# Patient Record
Sex: MALE | Race: WHITE | URBAN - METROPOLITAN AREA
[De-identification: names, ages, dates, MRNs, and addresses within clinical notes are randomized per-mention and may not be internally consistent; named-entity substitution may affect disease eponyms.]

---

## 2023-03-06 ENCOUNTER — HOME HEALTH ADMISSION (OUTPATIENT)
Dept: HOME HEALTH SERVICES | Facility: HOME HEALTH | Age: 88
End: 2023-03-06
Payer: MEDICARE

## 2023-03-13 ENCOUNTER — HOME CARE VISIT (OUTPATIENT)
Dept: SCHEDULING | Facility: HOME HEALTH | Age: 88
End: 2023-03-13

## 2023-03-13 VITALS
OXYGEN SATURATION: 97 % | HEART RATE: 79 BPM | DIASTOLIC BLOOD PRESSURE: 62 MMHG | SYSTOLIC BLOOD PRESSURE: 116 MMHG | TEMPERATURE: 96.9 F | RESPIRATION RATE: 18 BRPM

## 2023-03-13 PROCEDURE — G0299 HHS/HOSPICE OF RN EA 15 MIN: HCPCS

## 2023-03-13 PROCEDURE — 0221000100 HH NO PAY CLAIM PROCEDURE

## 2023-03-13 ASSESSMENT — ENCOUNTER SYMPTOMS
DYSPNEA ACTIVITY LEVEL: AFTER AMBULATING 10 - 20 FT
PAIN LOCATION - PAIN QUALITY: ACHY

## 2023-03-13 NOTE — HOME HEALTH
Patient is a 79 y/o/m with past medical hx of htn, afib, copd seen today for soc after recent dc from [facility] that resulted from uti, pt found at [facility] at time of arrival but later accompanied by declan, met writer at front door with use of walker, pt a+o x3, vs wnl, denies any current pain but reports occasional pain to right hip that is an old fx with hardware, and right knee pain. lung sounds clear, denies any sob or angina, bowel sounds present, reports adequate food/fluid intake, denies any issues with elimination or constipation, no s/s of uti, pt denies any recent falls, skin intact, thought process mostly organized, noted to be somewhat tangential, insight/judgement fair,  pt noted with f/u appt today, pt agreeable to services and consented to tx, PT added due to decreased mobility and endurance, all questions and concerns addressed, will continue to monitor,     POC approval received from PCP. Caregiver involvement: via Sera Siegel    Medications reconciled and all medications are available. MD notified of Severe medication interaction. No new orders at this time. Home health supplies by type and quantity ordered/delivered this visit include: na    Patient education provided this visit: SN educated patient and patient's caregiver on admission process, call us first procedure, s/s of infection.  Patient and patient caregiver verbalizes understanding via the teach back method. Progress toward goals: progressing well    Home exercise program: continue as ordered     Plan for next visit: disease/medication management     The following discharge planning was discussed with the patient/ patient's caregiver: DC when goals met.

## 2023-03-14 ENCOUNTER — HOME CARE VISIT (OUTPATIENT)
Dept: SCHEDULING | Facility: HOME HEALTH | Age: 88
End: 2023-03-14

## 2023-03-14 VITALS
DIASTOLIC BLOOD PRESSURE: 70 MMHG | SYSTOLIC BLOOD PRESSURE: 140 MMHG | RESPIRATION RATE: 18 BRPM | OXYGEN SATURATION: 96 % | TEMPERATURE: 97.1 F

## 2023-03-14 PROCEDURE — G0151 HHCP-SERV OF PT,EA 15 MIN: HCPCS

## 2023-03-14 ASSESSMENT — ENCOUNTER SYMPTOMS
STOOL DESCRIPTION: FORMED
PAIN LOCATION - PAIN QUALITY: ACHY

## 2023-03-14 NOTE — HOME HEALTH
Patient is a 80year old male with a dx of COPD who has a hx of falls, prostatic cancer and has had essential tremor since he was in his teens. He had recent hospitalization secondary to a fall and subsequently went to rehab at [facility]. He has been home now for about 5 days and was seen today by skilled physical therapy. He lives in a single family home with his cat and has a caregiver who comes in to check on him daily and assist him as needed. He was found today to have deficits in the following areas:   Posture: He presents with head forward posture, increased thoracic kyphosis, decreased lumbar lordosis and  mild flexion of hips in stance and throughout the gait cycle. Pain: Patient reports that he has intermittent pain in right hip during ambulation of 4/10 which is increased by ambulation. Strength: Grossly 3/5 for major muscle groups of bilateral hips, knees and ankles  Transfers: Mod assist for safety during transfers sit<>Stand, toilet<>Stand, bed<>stand and in and out of shower. Balance: Good in sitting. Fair- in standing. Patient scores 12/28 on [employer] WILLIAM indicating that she is at high risk of falls. Gait: Patient is currently ambulating with 4 wheeled walker with antalgic gait pattern, decreased bilateral foot clearance, heel toe gait, short step length. He ambulated 50 feet with 4ww today with verbal cues. Functionally, this patient is at high risk of falls secondary to weakness in B LEs and deficits in balance which make it difficult for him to transfer and walk safely. He would benefit from skilled physical therapy 2w3, 1w3 from 3/14/2023 to include ther exs, therapeutic activities, neuromuscular reeducation and gait training to decrease risk of falls and restore to prior level of function. Instructed patient today in safety during transfers including use of brakes on four wheeled walker.  Worked on standing balance with and without walker, immediate standing balance when he first stands from sitting. Also, instructed him in correct use of walker during ambulation, safety during turns, good bilateral foot clearance. in standing exercises holding on to kitchen sink: heel raises, hip extenxion, hip abduction, hams curls, marching, x 10 each exercise, each leg. Good performance of same using teach-back method.

## 2023-03-17 ENCOUNTER — HOME CARE VISIT (OUTPATIENT)
Dept: HOME HEALTH SERVICES | Facility: HOME HEALTH | Age: 88
End: 2023-03-17
Payer: MEDICARE

## 2023-03-22 ENCOUNTER — HOME HEALTH ADMISSION (OUTPATIENT)
Dept: HOME HEALTH SERVICES | Facility: HOME HEALTH | Age: 88
End: 2023-03-22

## 2023-04-02 ASSESSMENT — ENCOUNTER SYMPTOMS
DYSPNEA ACTIVITY LEVEL: AFTER AMBULATING 10 - 20 FT
STOOL DESCRIPTION: FORMED
PAIN LOCATION - PAIN QUALITY: ACHY

## 2023-04-02 NOTE — HOME HEALTH
Patient is being discharged from home health services as his caregiver reports that he is being seen by another agency. Findings in this report are base on PT intial evaluation o 3/14/2023. is a 80year old male with a dx of COPD who has a hx of falls, prostatic cancer and has had essential tremor since he was in his teens. He had recent hospitalization secondary to a fall and subsequently went to rehab at [facility]. He ws assessed by skilled physical therapy on 3/14/2023. He lives in a single family home with his cat and has a caregiver who comes in to check on him daily and assist him as needed. On that date he was found today to have deficits in the following areas: Posture: He presents with head forward posture, increased thoracic kyphosis, decreased lumbar lordosis and mild flexion of hips in stance and throughout the gait cycle. Pain: Patient reports that he has intermittent pain in right hip during ambulation of 4/10 which is increased by ambulation. Strength: Grossly 3/5 for major muscle groups of bilateral hips, knees and ankles Transfers: Mod assist for safety during transfers sit<>Stand, toilet<>Stand, bed<>stand and in and out of shower. Balance: Good in sitting. Fair- in standing. Patient scores 12/28 on Tinetti WILLIAM indicating that she is at high risk of falls. Gait: Patient is currently ambulating with 4 wheeled walker with antalgic gait pattern, decreased bilateral foot clearance, heel toe gait, short step length. He ambulated 50 feet with 4ww today with verbal cues. Functionally, this patient is at high risk of falls secondary to weakness in B LEs and deficits in balance which make it difficult for him to transfer and walk safely. He was instructed patient in safety during transfers including use of brakes on four wheeled walker. Worked on standing balance with and without walker, immediate standing balance when he first stands from sitting.  He was also instructed  in correct use of walker